# Patient Record
Sex: FEMALE | Race: BLACK OR AFRICAN AMERICAN
[De-identification: names, ages, dates, MRNs, and addresses within clinical notes are randomized per-mention and may not be internally consistent; named-entity substitution may affect disease eponyms.]

---

## 2018-02-18 NOTE — NUR
Patient discharged with v/s stable. Written and verbal after care instructions 
given and explained. 

Patient alert, oriented and verbalized understanding of instructions. 
Ambulatory with steady gait. All questions addressed prior to discharge. ID 
band removed. Patient advised to follow up with PMD. Rx of Augmentin given. 
Patient educated on indication of medication including possible reaction and 
side effects. Opportunity to ask questions provided and answered.

## 2018-02-18 NOTE — NUR
62F BIB SELF WITH C/O NASAL DRAINAGE X 2 DAYS, DENIES ANY FEVERS/CHILLS. NO 
RESP DISTRESS. RESP EVEN AND UNLABORED. PT IS AOX4 WITH STEADY GAIT. SKIN 
WARM/PINK/DRY. NAD. VSS. WILL CONTINUE TO MONITOR.

## 2019-04-25 ENCOUNTER — HOSPITAL ENCOUNTER (EMERGENCY)
Dept: HOSPITAL 26 - MED | Age: 64
Discharge: HOME | End: 2019-04-25
Payer: MEDICARE

## 2019-04-25 VITALS — SYSTOLIC BLOOD PRESSURE: 137 MMHG | DIASTOLIC BLOOD PRESSURE: 79 MMHG

## 2019-04-25 VITALS — HEIGHT: 71 IN | BODY MASS INDEX: 21 KG/M2 | WEIGHT: 150 LBS
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
## 2019-04-25 DIAGNOSIS — N39.0: ICD-10-CM

## 2019-04-25 DIAGNOSIS — S92.501A: Primary | ICD-10-CM

## 2019-04-25 DIAGNOSIS — Z85.038: ICD-10-CM

## 2019-04-25 DIAGNOSIS — I10: ICD-10-CM
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
## 2019-04-25 DIAGNOSIS — Z79.899: ICD-10-CM

## 2019-04-25 LAB
APPEARANCE UR: CLEAR
BILIRUB UR QL STRIP: (no result)
COLOR UR: YELLOW
GLUCOSE UR STRIP-MCNC: (no result) MG/DL
HGB UR QL STRIP: (no result)
LEUKOCYTE ESTERASE UR QL STRIP: NEGATIVE
NITRITE UR QL STRIP: NEGATIVE
PH UR STRIP: 6 [PH] (ref 5–9)

## 2019-04-25 PROCEDURE — 73660 X-RAY EXAM OF TOE(S): CPT

## 2019-04-25 PROCEDURE — 81003 URINALYSIS AUTO W/O SCOPE: CPT

## 2019-04-25 PROCEDURE — 96372 THER/PROPH/DIAG INJ SC/IM: CPT

## 2019-04-25 PROCEDURE — 99284 EMERGENCY DEPT VISIT MOD MDM: CPT

## 2019-04-25 NOTE — NUR
PT PRESENTED ER WITH C/O OF PAIN TO THE LEFT 2 TOES X 2 WEEKS. PT STATED SHE 
WAS PLAYING BASKETBALL WITH HER GRANDSON AND INJURED HER FOOT. PT HAS ROM AND 
IS ABLE TO WIGGLE TOES. NO BRUISING OR REDNESS TO SIGHT. PT IS A/O X 4. PAIN 
LEVEL IS 8/10 AT THIS TIME. ER MD MADE AWARE OF STATUS. SAFETY PROTOCOL 
IMPLEMENTED, BED RAILS UP X 1.

## 2019-04-25 NOTE — NUR
Patient discharged with v/s stable. Written and verbal after care instructions 
given and explained. 

Patient alert, oriented and verbalized understanding of instructions. 
Ambulatory with steady gait. All questions addressed prior to discharge. ID 
band removed. Patient advised to follow up with PMD. Rx of ZOFRAN, MOTRIN, AND 
BACTRIM WAS given. Patient educated on indication of medication including 
possible reaction and side effects. Opportunity to ask questions provided and 
answered.

## 2019-06-22 ENCOUNTER — HOSPITAL ENCOUNTER (EMERGENCY)
Dept: HOSPITAL 26 - MED | Age: 64
Discharge: HOME | End: 2019-06-22
Payer: MEDICARE

## 2019-06-22 VITALS — SYSTOLIC BLOOD PRESSURE: 120 MMHG | DIASTOLIC BLOOD PRESSURE: 91 MMHG

## 2019-06-22 VITALS — HEIGHT: 71 IN | WEIGHT: 134 LBS | BODY MASS INDEX: 18.76 KG/M2

## 2019-06-22 DIAGNOSIS — Z90.49: ICD-10-CM

## 2019-06-22 DIAGNOSIS — I10: ICD-10-CM

## 2019-06-22 DIAGNOSIS — Z98.890: ICD-10-CM

## 2019-06-22 DIAGNOSIS — R19.7: ICD-10-CM

## 2019-06-22 DIAGNOSIS — Z79.899: ICD-10-CM

## 2019-06-22 DIAGNOSIS — Z85.038: ICD-10-CM

## 2019-06-22 DIAGNOSIS — N39.0: Primary | ICD-10-CM

## 2019-06-22 PROCEDURE — 81025 URINE PREGNANCY TEST: CPT

## 2019-06-22 PROCEDURE — 81002 URINALYSIS NONAUTO W/O SCOPE: CPT

## 2019-06-22 PROCEDURE — 96372 THER/PROPH/DIAG INJ SC/IM: CPT

## 2019-06-22 PROCEDURE — 99283 EMERGENCY DEPT VISIT LOW MDM: CPT

## 2019-06-22 NOTE — NUR
BIB SELF. PT C/O  BURNING SENSATION DURING URINATION, CHILLS SINCE YESTERDAY. 
STATES HAVING PAIN 8/10 AT LOWER ABD, DENIES FEVER, NAUSEA, VOMITING.PT AAOX4. 
ABDOMEN SOFT AND NON-TENDER TO TOUCH.VS NORMAL AT THIS TIME. PROVIDED URINE CUP 
FOR SPECIMEN. ER MD TO SEE PT.

## 2019-06-22 NOTE — NUR
Patient discharged with v/s stable. Written and verbal after care instructions 
given and explained. 

Patient alert, oriented and verbalized understanding of instructions. 
Ambulatory with steady gait. All questions addressed prior to discharge. ID 
band removed. Patient advised to follow up with PMD. Rx of INDERAL, MOTRIN AND 
CIPRO given. Patient educated on indication of medication including possible 
reaction and side effects. Opportunity to ask questions provided and answered.

## 2019-07-10 ENCOUNTER — HOSPITAL ENCOUNTER (EMERGENCY)
Dept: HOSPITAL 26 - MED | Age: 64
Discharge: HOME | End: 2019-07-10
Payer: MEDICARE

## 2019-07-10 VITALS — DIASTOLIC BLOOD PRESSURE: 60 MMHG | SYSTOLIC BLOOD PRESSURE: 122 MMHG

## 2019-07-10 VITALS — HEIGHT: 71 IN | BODY MASS INDEX: 17.5 KG/M2 | WEIGHT: 125 LBS

## 2019-07-10 VITALS — DIASTOLIC BLOOD PRESSURE: 67 MMHG | SYSTOLIC BLOOD PRESSURE: 133 MMHG

## 2019-07-10 DIAGNOSIS — Z79.899: ICD-10-CM

## 2019-07-10 DIAGNOSIS — R27.0: Primary | ICD-10-CM

## 2019-07-10 DIAGNOSIS — Z85.038: ICD-10-CM

## 2019-07-10 DIAGNOSIS — I10: ICD-10-CM

## 2019-07-10 DIAGNOSIS — F16.10: ICD-10-CM

## 2019-07-10 DIAGNOSIS — F32.9: ICD-10-CM

## 2019-07-10 DIAGNOSIS — Z86.19: ICD-10-CM

## 2019-07-10 LAB
ALBUMIN FLD-MCNC: 3.5 G/DL (ref 3.4–5)
AMYLASE SERPL-CCNC: 147 U/L (ref 25–115)
ANION GAP SERPL CALCULATED.3IONS-SCNC: 11.2 MMOL/L (ref 8–16)
APPEARANCE UR: CLEAR
AST SERPL-CCNC: 53 U/L (ref 15–37)
BARBITURATES UR QL SCN: (no result) NG/ML
BASOPHILS # BLD AUTO: 0 K/UL (ref 0–0.22)
BASOPHILS NFR BLD AUTO: 0.4 % (ref 0–2)
BENZODIAZ UR QL SCN: (no result) NG/ML
BILIRUB SERPL-MCNC: 0.4 MG/DL (ref 0–1)
BILIRUB UR QL STRIP: NEGATIVE
BUN SERPL-MCNC: 21 MG/DL (ref 7–18)
BZE UR QL SCN: (no result) NG/ML
CANNABINOIDS UR QL SCN: (no result) NG/ML
CHLORIDE SERPL-SCNC: 103 MMOL/L (ref 98–107)
CO2 SERPL-SCNC: 29.8 MMOL/L (ref 21–32)
COLOR UR: YELLOW
CREAT SERPL-MCNC: 0.9 MG/DL (ref 0.6–1.3)
DEPRECATED D DIMER PPP-ACNC: 452 NG/ML (ref 0–400)
EOSINOPHIL # BLD AUTO: 0 K/UL (ref 0–0.4)
EOSINOPHIL NFR BLD AUTO: 0.9 % (ref 0–4)
ERYTHROCYTE [DISTWIDTH] IN BLOOD BY AUTOMATED COUNT: 14.6 % (ref 11.6–13.7)
GFR SERPL CREATININE-BSD FRML MDRD: 81 ML/MIN (ref 90–?)
GLUCOSE SERPL-MCNC: 107 MG/DL (ref 74–106)
GLUCOSE UR STRIP-MCNC: NEGATIVE MG/DL
HCT VFR BLD AUTO: 38.1 % (ref 36–48)
HGB BLD-MCNC: 13 G/DL (ref 12–16)
HGB UR QL STRIP: NEGATIVE
LEUKOCYTE ESTERASE UR QL STRIP: NEGATIVE
LIPASE SERPL-CCNC: 418 U/L (ref 73–393)
LYMPHOCYTES # BLD AUTO: 1.3 K/UL (ref 2.5–16.5)
LYMPHOCYTES NFR BLD AUTO: 34.9 % (ref 20.5–51.1)
MAGNESIUM SERPL-MCNC: 1.8 MG/DL (ref 1.8–2.4)
MCH RBC QN AUTO: 32 PG (ref 27–31)
MCHC RBC AUTO-ENTMCNC: 34 G/DL (ref 33–37)
MCV RBC AUTO: 94.6 FL (ref 80–94)
MONOCYTES # BLD AUTO: 0.4 K/UL (ref 0.8–1)
MONOCYTES NFR BLD AUTO: 11.2 % (ref 1.7–9.3)
NEUTROPHILS # BLD AUTO: 2 K/UL (ref 1.8–7.7)
NEUTROPHILS NFR BLD AUTO: 52.6 % (ref 42.2–75.2)
NITRITE UR QL STRIP: NEGATIVE
OPIATES UR QL SCN: (no result) NG/ML
PCP UR QL SCN: (no result) NG/ML
PH UR STRIP: 6.5 [PH] (ref 5–9)
PLATELET # BLD AUTO: 167 K/UL (ref 140–450)
POTASSIUM SERPL-SCNC: 4 MMOL/L (ref 3.5–5.1)
PROTHROMBIN TIME: 9.5 SECS (ref 10.8–13.4)
RBC # BLD AUTO: 4.03 MIL/UL (ref 4.2–5.4)
SODIUM SERPL-SCNC: 140 MMOL/L (ref 136–145)
URATE SERPL-MCNC: 3.5 MG/DL (ref 2.6–7.2)
WBC # BLD AUTO: 3.7 K/UL (ref 4.8–10.8)

## 2019-07-10 PROCEDURE — 83690 ASSAY OF LIPASE: CPT

## 2019-07-10 PROCEDURE — 83735 ASSAY OF MAGNESIUM: CPT

## 2019-07-10 PROCEDURE — 81003 URINALYSIS AUTO W/O SCOPE: CPT

## 2019-07-10 PROCEDURE — 70450 CT HEAD/BRAIN W/O DYE: CPT

## 2019-07-10 PROCEDURE — 96374 THER/PROPH/DIAG INJ IV PUSH: CPT

## 2019-07-10 PROCEDURE — 80173 ASSAY OF HALOPERIDOL: CPT

## 2019-07-10 PROCEDURE — 80053 COMPREHEN METABOLIC PANEL: CPT

## 2019-07-10 PROCEDURE — 86140 C-REACTIVE PROTEIN: CPT

## 2019-07-10 PROCEDURE — 82550 ASSAY OF CK (CPK): CPT

## 2019-07-10 PROCEDURE — 86886 COOMBS TEST INDIRECT TITER: CPT

## 2019-07-10 PROCEDURE — 85025 COMPLETE CBC W/AUTO DIFF WBC: CPT

## 2019-07-10 PROCEDURE — 81025 URINE PREGNANCY TEST: CPT

## 2019-07-10 PROCEDURE — 86901 BLOOD TYPING SEROLOGIC RH(D): CPT

## 2019-07-10 PROCEDURE — 84484 ASSAY OF TROPONIN QUANT: CPT

## 2019-07-10 PROCEDURE — 71045 X-RAY EXAM CHEST 1 VIEW: CPT

## 2019-07-10 PROCEDURE — 82150 ASSAY OF AMYLASE: CPT

## 2019-07-10 PROCEDURE — 85610 PROTHROMBIN TIME: CPT

## 2019-07-10 PROCEDURE — 80305 DRUG TEST PRSMV DIR OPT OBS: CPT

## 2019-07-10 PROCEDURE — 84550 ASSAY OF BLOOD/URIC ACID: CPT

## 2019-07-10 PROCEDURE — 86900 BLOOD TYPING SEROLOGIC ABO: CPT

## 2019-07-10 PROCEDURE — 93005 ELECTROCARDIOGRAM TRACING: CPT

## 2019-07-10 PROCEDURE — 85379 FIBRIN DEGRADATION QUANT: CPT

## 2019-07-10 PROCEDURE — 36415 COLL VENOUS BLD VENIPUNCTURE: CPT

## 2019-07-10 PROCEDURE — 99284 EMERGENCY DEPT VISIT MOD MDM: CPT

## 2019-07-10 NOTE — NUR
Patient discharged with v/s stable. Written and verbal after care instructions 
given and explained. 

Patient alert, oriented and verbalized understanding of instructions. 
Ambulatory with steady gait. All questions addressed prior to discharge. ID 
band removed. Patient advised to follow up with PMD. Rx of VISTARIL given. 
Patient educated on indication of medication including possible reaction and 
side effects. Opportunity to ask questions provided and answered.

## 2019-07-10 NOTE — NUR
PT BIBA C/O FALL, PT STATES SHE WAS GETTING OUT OF CAR AND FELL AND HIT BACK OF 
HEAD, PT DENIES LOC, PT STATES SHE FELL 2 DAYS AGO AND HAD STITCHES PLACED TO 
RT SIDE OF EYEBROW. PT AWAKE AND ALERT, PUPILS EQUAL AND REACTIVE, PT RECALLS 
ALL EVENTS THAT HAPPENED TODAY, AMBULATORY . DENIES N/V/D; SKIN IS 
PINK/WARM/DRY; AAOX4; PT DENIES ANY FEVER, CP, SOB, OR COUGH AT THIS TIME; 
PATIENT STATES BACK PAIN AND HEADACHE OF 7/10 AT THIS TIME; VSS; PATIENT 
POSITIONED FOR COMFORT; HOB ELEVATED; BEDRAILS UP X1; BED DOWN. ER MD MADE 
AWARE OF PT STATUS.

## 2019-07-25 ENCOUNTER — HOSPITAL ENCOUNTER (EMERGENCY)
Dept: HOSPITAL 26 - MED | Age: 64
Discharge: HOME | End: 2019-07-25
Payer: MEDICARE

## 2019-07-25 VITALS — DIASTOLIC BLOOD PRESSURE: 70 MMHG | SYSTOLIC BLOOD PRESSURE: 135 MMHG

## 2019-07-25 VITALS — WEIGHT: 130 LBS | BODY MASS INDEX: 18.2 KG/M2 | HEIGHT: 71 IN

## 2019-07-25 VITALS — SYSTOLIC BLOOD PRESSURE: 144 MMHG | DIASTOLIC BLOOD PRESSURE: 71 MMHG

## 2019-07-25 DIAGNOSIS — I10: ICD-10-CM

## 2019-07-25 DIAGNOSIS — R30.0: ICD-10-CM

## 2019-07-25 DIAGNOSIS — R05: ICD-10-CM

## 2019-07-25 DIAGNOSIS — F03.90: Primary | ICD-10-CM

## 2019-07-25 DIAGNOSIS — Z85.038: ICD-10-CM

## 2019-07-25 DIAGNOSIS — Z79.899: ICD-10-CM

## 2019-07-25 LAB
ALBUMIN FLD-MCNC: 3.1 G/DL (ref 3.4–5)
ANION GAP SERPL CALCULATED.3IONS-SCNC: 8.2 MMOL/L (ref 8–16)
APPEARANCE UR: CLEAR
AST SERPL-CCNC: 35 U/L (ref 15–37)
BASOPHILS # BLD AUTO: 0 K/UL (ref 0–0.22)
BASOPHILS NFR BLD AUTO: 0.2 % (ref 0–2)
BILIRUB SERPL-MCNC: 0.4 MG/DL (ref 0–1)
BILIRUB UR QL STRIP: NEGATIVE
BUN SERPL-MCNC: 10 MG/DL (ref 7–18)
CHLORIDE SERPL-SCNC: 108 MMOL/L (ref 98–107)
CO2 SERPL-SCNC: 30.9 MMOL/L (ref 21–32)
COLOR UR: YELLOW
CREAT SERPL-MCNC: 0.8 MG/DL (ref 0.6–1.3)
EOSINOPHIL # BLD AUTO: 0.1 K/UL (ref 0–0.4)
EOSINOPHIL NFR BLD AUTO: 2.5 % (ref 0–4)
ERYTHROCYTE [DISTWIDTH] IN BLOOD BY AUTOMATED COUNT: 14.1 % (ref 11.6–13.7)
GFR SERPL CREATININE-BSD FRML MDRD: 93 ML/MIN (ref 90–?)
GLUCOSE SERPL-MCNC: 91 MG/DL (ref 74–106)
GLUCOSE UR STRIP-MCNC: NEGATIVE MG/DL
HCT VFR BLD AUTO: 32.5 % (ref 36–48)
HGB BLD-MCNC: 11.3 G/DL (ref 12–16)
HGB UR QL STRIP: NEGATIVE
LEUKOCYTE ESTERASE UR QL STRIP: (no result)
LYMPHOCYTES # BLD AUTO: 1.1 K/UL (ref 2.5–16.5)
LYMPHOCYTES NFR BLD AUTO: 42.8 % (ref 20.5–51.1)
MCH RBC QN AUTO: 32 PG (ref 27–31)
MCHC RBC AUTO-ENTMCNC: 35 G/DL (ref 33–37)
MCV RBC AUTO: 93.5 FL (ref 80–94)
MONOCYTES # BLD AUTO: 0.4 K/UL (ref 0.8–1)
MONOCYTES NFR BLD AUTO: 15.8 % (ref 1.7–9.3)
NEUTROPHILS # BLD AUTO: 1 K/UL (ref 1.8–7.7)
NEUTROPHILS NFR BLD AUTO: 38.7 % (ref 42.2–75.2)
NITRITE UR QL STRIP: NEGATIVE
PH UR STRIP: 8.5 [PH] (ref 5–9)
PLATELET # BLD AUTO: 133 K/UL (ref 140–450)
POTASSIUM SERPL-SCNC: 4.1 MMOL/L (ref 3.5–5.1)
RBC # BLD AUTO: 3.48 MIL/UL (ref 4.2–5.4)
RBC #/AREA URNS HPF: (no result) /HPF (ref 0–5)
SODIUM SERPL-SCNC: 143 MMOL/L (ref 136–145)
WBC # BLD AUTO: 2.7 K/UL (ref 4.8–10.8)
WBC,URINE: (no result) /HPF (ref 0–5)

## 2019-07-25 PROCEDURE — 85025 COMPLETE CBC W/AUTO DIFF WBC: CPT

## 2019-07-25 PROCEDURE — 84484 ASSAY OF TROPONIN QUANT: CPT

## 2019-07-25 PROCEDURE — 93005 ELECTROCARDIOGRAM TRACING: CPT

## 2019-07-25 PROCEDURE — 99284 EMERGENCY DEPT VISIT MOD MDM: CPT

## 2019-07-25 PROCEDURE — 71045 X-RAY EXAM CHEST 1 VIEW: CPT

## 2019-07-25 PROCEDURE — 80053 COMPREHEN METABOLIC PANEL: CPT

## 2019-07-25 PROCEDURE — 36415 COLL VENOUS BLD VENIPUNCTURE: CPT

## 2019-07-25 PROCEDURE — 81001 URINALYSIS AUTO W/SCOPE: CPT

## 2019-07-25 NOTE — NUR
BIB ALS, C/O cough, chest pain, headache x 3 days . pt also c/o dysuria and 
urgency x 3 days. DENIES N/V/D; SKIN IS PINK/WARM/DRY;awake,alert. LUNGS CLEAR 
BL; HR EVEN AND REGULAR; PT DENIES ANY FEVER, SOB, AT THIS TIME; PATIENT STATES 
PAIN OF 7/10 AT THIS TIME; VSS; PATIENT POSITIONED FOR COMFORT; HOB ELEVATED; 
BEDRAILS UP X2; BED DOWN. ER MD MADE AWARE OF PT STATUS.

## 2019-07-25 NOTE — NUR
PT STS " I DO NOT WANT TO GO BACK TO MY DAUGHTER HOUSE BECAUSE 

ALL SHE WANTS ME TO DO IS WATCH HER KIDS. I WANT TO GO BACK TO THE NURSING 
FACILITY I WAS AT."

## 2019-07-25 NOTE — NUR
Patient discharged with v/s stable. Written and verbal after care instructions 
given and explained. 

Patient alert, oriented and verbalized understanding of instructions. 
Ambulatory with steady gait. All questions addressed prior to discharge. ID 
band removed. Patient advised to follow up with PMD. Rx of PROMETHAZINE NAD 
PYRIDIUM given. Patient educated on indication of medication including possible 
reaction and side effects. Opportunity to ask questions provided and answered.

## 2019-07-25 NOTE — NUR
-------------------------------------------------------------------------------

            *** Note undone in EDM - 07/25/19 at 1234 by ERICA ***            

-------------------------------------------------------------------------------

Patient discharged with v/s stable. Written and verbal after care instructions 
given and explained. 

Patient alert, oriented and verbalized understanding of instructions. 
Ambulatory with steady gait. All questions addressed prior to discharge. ID 
band removed. Patient advised to follow up with PMD. Rx of PROMETHAZINE NAD 
PYRIDIUM given. Patient educated on indication of medication including possible 
reaction and side effects. Opportunity to ask questions provided and answered. 
PT STATED SHE WANTS TO WAIT FOR SOCIAL WORK IN [employer]. LUNCH PACKAGE OFFERED.

## 2019-08-24 ENCOUNTER — HOSPITAL ENCOUNTER (INPATIENT)
Dept: HOSPITAL 26 - MED | Age: 64
LOS: 2 days | Discharge: HOME | DRG: 812 | End: 2019-08-26
Attending: GENERAL PRACTICE | Admitting: GENERAL PRACTICE
Payer: MEDICAID

## 2019-08-24 VITALS — DIASTOLIC BLOOD PRESSURE: 75 MMHG | SYSTOLIC BLOOD PRESSURE: 129 MMHG

## 2019-08-24 VITALS — WEIGHT: 122 LBS | HEIGHT: 71 IN | BODY MASS INDEX: 17.08 KG/M2

## 2019-08-24 VITALS — SYSTOLIC BLOOD PRESSURE: 136 MMHG | DIASTOLIC BLOOD PRESSURE: 78 MMHG

## 2019-08-24 DIAGNOSIS — Z72.89: ICD-10-CM

## 2019-08-24 DIAGNOSIS — E83.42: ICD-10-CM

## 2019-08-24 DIAGNOSIS — E86.0: ICD-10-CM

## 2019-08-24 DIAGNOSIS — Z85.038: ICD-10-CM

## 2019-08-24 DIAGNOSIS — Z83.3: ICD-10-CM

## 2019-08-24 DIAGNOSIS — Z79.899: ICD-10-CM

## 2019-08-24 DIAGNOSIS — G89.29: ICD-10-CM

## 2019-08-24 DIAGNOSIS — G92: ICD-10-CM

## 2019-08-24 DIAGNOSIS — E78.1: ICD-10-CM

## 2019-08-24 DIAGNOSIS — N20.0: ICD-10-CM

## 2019-08-24 DIAGNOSIS — G43.909: ICD-10-CM

## 2019-08-24 DIAGNOSIS — T40.7X1A: Primary | ICD-10-CM
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
## 2019-08-24 DIAGNOSIS — I10: ICD-10-CM

## 2019-08-24 DIAGNOSIS — F12.90: ICD-10-CM

## 2019-08-24 DIAGNOSIS — Z90.49: ICD-10-CM

## 2019-08-24 DIAGNOSIS — K85.90: ICD-10-CM

## 2019-08-24 DIAGNOSIS — Z82.49: ICD-10-CM

## 2019-08-24 DIAGNOSIS — N39.0: ICD-10-CM

## 2019-08-24 DIAGNOSIS — K21.9: ICD-10-CM

## 2019-08-24 DIAGNOSIS — Z91.19: ICD-10-CM

## 2019-08-24 DIAGNOSIS — M54.9: ICD-10-CM

## 2019-08-24 DIAGNOSIS — N94.89: ICD-10-CM

## 2019-08-24 LAB
ALBUMIN FLD-MCNC: 3.4 G/DL (ref 3.4–5)
AMYLASE SERPL-CCNC: 221 U/L (ref 25–115)
ANION GAP SERPL CALCULATED.3IONS-SCNC: 11.5 MMOL/L (ref 8–16)
APPEARANCE UR: CLEAR
AST SERPL-CCNC: 31 U/L (ref 15–37)
BASOPHILS # BLD AUTO: 0 K/UL (ref 0–0.22)
BASOPHILS NFR BLD AUTO: 0.6 % (ref 0–2)
BILIRUB SERPL-MCNC: 0.3 MG/DL (ref 0–1)
BILIRUB UR QL STRIP: NEGATIVE
BUN SERPL-MCNC: 15 MG/DL (ref 7–18)
CAOX CRY URNS QL MICRO: (no result) /HPF
CHLORIDE SERPL-SCNC: 103 MMOL/L (ref 98–107)
CHOLEST/HDLC SERPL: 2.1 {RATIO} (ref 1–4.5)
CO2 SERPL-SCNC: 29.2 MMOL/L (ref 21–32)
COLOR UR: YELLOW
CREAT SERPL-MCNC: 0.8 MG/DL (ref 0.6–1.3)
EOSINOPHIL # BLD AUTO: 0 K/UL (ref 0–0.4)
EOSINOPHIL NFR BLD AUTO: 1 % (ref 0–4)
ERYTHROCYTE [DISTWIDTH] IN BLOOD BY AUTOMATED COUNT: 13.1 % (ref 11.6–13.7)
GFR SERPL CREATININE-BSD FRML MDRD: 93 ML/MIN (ref 90–?)
GLUCOSE SERPL-MCNC: 107 MG/DL (ref 74–106)
GLUCOSE UR STRIP-MCNC: NEGATIVE MG/DL
HCT VFR BLD AUTO: 34.4 % (ref 36–48)
HDLC SERPL-MCNC: 70 MG/DL (ref 40–60)
HGB BLD-MCNC: 12 G/DL (ref 12–16)
HGB UR QL STRIP: NEGATIVE
LDLC SERPL CALC-MCNC: 40 MG/DL (ref 60–100)
LEUKOCYTE ESTERASE UR QL STRIP: (no result)
LIPASE SERPL-CCNC: 1118 U/L (ref 73–393)
LYMPHOCYTES # BLD AUTO: 1.5 K/UL (ref 2.5–16.5)
LYMPHOCYTES NFR BLD AUTO: 43.7 % (ref 20.5–51.1)
MAGNESIUM SERPL-MCNC: 1.7 MG/DL (ref 1.8–2.4)
MCH RBC QN AUTO: 33 PG (ref 27–31)
MCHC RBC AUTO-ENTMCNC: 35 G/DL (ref 33–37)
MCV RBC AUTO: 93.2 FL (ref 80–94)
MONOCYTES # BLD AUTO: 0.4 K/UL (ref 0.8–1)
MONOCYTES NFR BLD AUTO: 11.1 % (ref 1.7–9.3)
NEUTROPHILS # BLD AUTO: 1.5 K/UL (ref 1.8–7.7)
NEUTROPHILS NFR BLD AUTO: 43.6 % (ref 42.2–75.2)
NITRITE UR QL STRIP: NEGATIVE
PH UR STRIP: 5.5 [PH] (ref 5–9)
PHOSPHATE SERPL-MCNC: 3.6 MG/DL (ref 2.5–4.9)
PLATELET # BLD AUTO: 119 K/UL (ref 140–450)
POTASSIUM SERPL-SCNC: 3.7 MMOL/L (ref 3.5–5.1)
PROTHROMBIN TIME: 10.4 SECS (ref 10.8–13.4)
RBC # BLD AUTO: 3.69 MIL/UL (ref 4.2–5.4)
RBC #/AREA URNS HPF: (no result) /HPF (ref 0–5)
SODIUM SERPL-SCNC: 140 MMOL/L (ref 136–145)
TRIGL SERPL-MCNC: 177 MG/DL (ref 30–150)
TSH SERPL DL<=0.05 MIU/L-ACNC: 0.48 UIU/ML (ref 0.34–3.74)
WBC # BLD AUTO: 3.5 K/UL (ref 4.8–10.8)
WBC,URINE: (no result) /HPF (ref 0–5)

## 2019-08-24 PROCEDURE — C9113 INJ PANTOPRAZOLE SODIUM, VIA: HCPCS

## 2019-08-24 RX ADMIN — TAMSULOSIN HYDROCHLORIDE SCH MG: 0.4 CAPSULE ORAL at 22:03

## 2019-08-24 RX ADMIN — Medication SCH ML: at 21:00

## 2019-08-24 RX ADMIN — PANTOPRAZOLE SODIUM SCH MG: 40 INJECTION, POWDER, FOR SOLUTION INTRAVENOUS at 21:14

## 2019-08-24 RX ADMIN — SODIUM CHLORIDE SCH MLS/HR: 9 INJECTION, SOLUTION INTRAVENOUS at 21:14

## 2019-08-24 NOTE — NUR
C/O PERIUMBILICAL PAIN X 4 DAYS THAT IS ACHING AND COMES AND GOES, PAINFUL 
URINATION X 3 DAYS, CONSTANT MIGRAINE X 3 DAYS. STATES MIGRAINE GALEANO IS CAUSING 
THE MOST PAIN RIGHT NOW 10/10. STATES NAUSEA. DENIES VOMITING. DENIES DIARRHEA. 
LBM TODAY. FELT WARM LAST NIGHT BUT DID NOT CHECK TEMP. TRIED PUTTING ICE PACK 
TO HEAD FOR HA BUT NO RELIEF. 



MED HX: HTN

-------------------------------------------------------------------------------

Addendum: 08/24/19 at 1617 by CARIE

-------------------------------------------------------------------------------

NOTE BY RAJ MCKAY RN

## 2019-08-24 NOTE — NUR
ADMITTED THIS 63 YEAR OLD FEMALE FORM ER PER WHEELCHAIR WITH CC OF ABDOMINAL PAIN AND BACK 
PAIN, AMBULATORY TO BED WITH STEADY GAIT, ASSESSMENT DONE, VITAL SIGNS STABLE, DENIES PAIN 
AT THIS TIME, ORIENTED TO ROOM AND CALL LIGHT, INSTRUCTED NPO EXCEPT MEDS AS ORDERED, 
VERBALIZED UNDERSTANDING, IVF INFUSING WELL, PLAN OF CARE DISCUSSED, CALL LIGHT WITHIN 
REACH.

## 2019-08-24 NOTE — NUR
PT COMPLAINING OF PAIN, VITAL SIGNS STABLE, MEDICATED WITH MORPHINE IVP, CONTINUE TO MONITOR 
CLOSELY.

## 2019-08-24 NOTE — NUR
Patient will be admitted to care of DR. GONSALVES. Admited to MED SURG.  Will go to 
[id number]. Belongings list completed.  Report to ALLY PINZON.

## 2019-08-25 VITALS — SYSTOLIC BLOOD PRESSURE: 121 MMHG | DIASTOLIC BLOOD PRESSURE: 61 MMHG

## 2019-08-25 VITALS — SYSTOLIC BLOOD PRESSURE: 123 MMHG | DIASTOLIC BLOOD PRESSURE: 70 MMHG

## 2019-08-25 VITALS — SYSTOLIC BLOOD PRESSURE: 110 MMHG | DIASTOLIC BLOOD PRESSURE: 64 MMHG

## 2019-08-25 LAB
ANION GAP SERPL CALCULATED.3IONS-SCNC: 8.1 MMOL/L (ref 8–16)
BARBITURATES UR QL SCN: (no result) NG/ML
BASOPHILS # BLD AUTO: 0 K/UL (ref 0–0.22)
BASOPHILS NFR BLD AUTO: 0.4 % (ref 0–2)
BENZODIAZ UR QL SCN: (no result) NG/ML
BUN SERPL-MCNC: 8 MG/DL (ref 7–18)
BZE UR QL SCN: (no result) NG/ML
CANNABINOIDS UR QL SCN: (no result) NG/ML
CHLORIDE SERPL-SCNC: 108 MMOL/L (ref 98–107)
CO2 SERPL-SCNC: 27.5 MMOL/L (ref 21–32)
CREAT SERPL-MCNC: 0.6 MG/DL (ref 0.6–1.3)
EOSINOPHIL # BLD AUTO: 0 K/UL (ref 0–0.4)
EOSINOPHIL NFR BLD AUTO: 1.8 % (ref 0–4)
ERYTHROCYTE [DISTWIDTH] IN BLOOD BY AUTOMATED COUNT: 13.3 % (ref 11.6–13.7)
GFR SERPL CREATININE-BSD FRML MDRD: 129 ML/MIN (ref 90–?)
GLUCOSE SERPL-MCNC: 87 MG/DL (ref 74–106)
HCT VFR BLD AUTO: 29 % (ref 36–48)
HGB BLD-MCNC: 10 G/DL (ref 12–16)
LIPASE SERPL-CCNC: 358 U/L (ref 73–393)
LYMPHOCYTES # BLD AUTO: 1.2 K/UL (ref 2.5–16.5)
LYMPHOCYTES NFR BLD AUTO: 45.3 % (ref 20.5–51.1)
MAGNESIUM SERPL-MCNC: 1.6 MG/DL (ref 1.8–2.4)
MCH RBC QN AUTO: 32 PG (ref 27–31)
MCHC RBC AUTO-ENTMCNC: 35 G/DL (ref 33–37)
MCV RBC AUTO: 93.1 FL (ref 80–94)
MONOCYTES # BLD AUTO: 0.3 K/UL (ref 0.8–1)
MONOCYTES NFR BLD AUTO: 11.8 % (ref 1.7–9.3)
NEUTROPHILS # BLD AUTO: 1 K/UL (ref 1.8–7.7)
NEUTROPHILS NFR BLD AUTO: 40.7 % (ref 42.2–75.2)
OPIATES UR QL SCN: (no result) NG/ML
PCP UR QL SCN: (no result) NG/ML
PHOSPHATE SERPL-MCNC: 3.3 MG/DL (ref 2.5–4.9)
PLATELET # BLD AUTO: 92 K/UL (ref 140–450)
POTASSIUM SERPL-SCNC: 3.6 MMOL/L (ref 3.5–5.1)
RBC # BLD AUTO: 3.11 MIL/UL (ref 4.2–5.4)
SODIUM SERPL-SCNC: 140 MMOL/L (ref 136–145)
WBC # BLD AUTO: 2.6 K/UL (ref 4.8–10.8)

## 2019-08-25 RX ADMIN — TAMSULOSIN HYDROCHLORIDE SCH MG: 0.4 CAPSULE ORAL at 10:02

## 2019-08-25 RX ADMIN — SODIUM CHLORIDE SCH MLS/HR: 9 INJECTION, SOLUTION INTRAVENOUS at 07:20

## 2019-08-25 RX ADMIN — SODIUM CHLORIDE SCH MLS/HR: 9 INJECTION, SOLUTION INTRAVENOUS at 16:15

## 2019-08-25 RX ADMIN — SODIUM CHLORIDE SCH MLS/HR: 9 INJECTION, SOLUTION INTRAVENOUS at 05:37

## 2019-08-25 RX ADMIN — SIMETHICONE CHEW TAB 80 MG PRN MG: 80 TABLET ORAL at 20:04

## 2019-08-25 RX ADMIN — PANTOPRAZOLE SODIUM SCH MG: 40 INJECTION, POWDER, FOR SOLUTION INTRAVENOUS at 10:02

## 2019-08-25 RX ADMIN — Medication PRN DROP: at 20:04

## 2019-08-25 RX ADMIN — SODIUM CHLORIDE SCH MLS/HR: 9 INJECTION, SOLUTION INTRAVENOUS at 01:11

## 2019-08-25 RX ADMIN — Medication SCH EACH: at 10:02

## 2019-08-25 RX ADMIN — Medication SCH DROP: at 20:16

## 2019-08-25 NOTE — NUR
PT SLEEPING, AROUSABLE TO NAME, VITAL SIGNS STABLE, DENIES ANY PAIN, IVF INFUSING WELL, 
CONTINUE TO MONITOR CLOSELY.

## 2019-08-25 NOTE — NUR
RECEIVED HAND OFF REPORT FROM PM RN PT AWAKE IN BED PT APPEARS STABLE AND IN NO APPARENT 
DISTRESS. ALL SAFETY MEASURES ARE IN PLACE WILL CONTINUE TO MONITOR.

## 2019-08-25 NOTE — NUR
PT MOVED TO 127A PT STABLE AND IN NO APPARENT DISTRESS. ALL SAFETY MEASURES ARE IN PLACE 
WILL CONTINUE TO MONITOR. PT HAS ALL PERSONAL BELONGINGS

## 2019-08-25 NOTE — NUR
FREQUENT ROUNDING ON PT PT APPEARS STABLE AND IN NO APPARENT DISTRESS. ALL SAFETY MEASURES 
ARE IN PLACE WILL CONTINUE TO MONITOR.

## 2019-08-25 NOTE — NUR
FREQUENT ROUNDING ON PT PT APPEARS STABLE AND IN NO APPARENT DISTRESS. ALL SAFETY MEASURES 
ARE IN PLACE WILL CONTINUE TO MONITOR

## 2019-08-25 NOTE — NUR
RECEIVED PT ON BED, AAOX4, DENIES ANY PAIN, IVF INFUSING WELL, STATED TOLERATING REGULAR 
DIET, PLAN OF CARE DISCUSSED, SAFETY MEASURES IN PLACE, CALL LIGHT WITHIN REACH.

## 2019-08-25 NOTE — NUR
PT COMPLAINING OF GAS, DR CASTELLON WITH NEW ORDER, MYLICON PO GIVEN, DUE ROCEPHIN IVPB 
ADMINISTERED, ALL NEEDS ATTENDED.

## 2019-08-26 VITALS — DIASTOLIC BLOOD PRESSURE: 65 MMHG | SYSTOLIC BLOOD PRESSURE: 127 MMHG

## 2019-08-26 VITALS — DIASTOLIC BLOOD PRESSURE: 68 MMHG | SYSTOLIC BLOOD PRESSURE: 130 MMHG

## 2019-08-26 LAB
ANION GAP SERPL CALCULATED.3IONS-SCNC: 8.8 MMOL/L (ref 8–16)
BASOPHILS # BLD AUTO: 0 K/UL (ref 0–0.22)
BASOPHILS NFR BLD AUTO: 0.4 % (ref 0–2)
BUN SERPL-MCNC: 6 MG/DL (ref 7–18)
CHLORIDE SERPL-SCNC: 109 MMOL/L (ref 98–107)
CO2 SERPL-SCNC: 27.1 MMOL/L (ref 21–32)
CREAT SERPL-MCNC: 0.7 MG/DL (ref 0.6–1.3)
EOSINOPHIL # BLD AUTO: 0.1 K/UL (ref 0–0.4)
EOSINOPHIL NFR BLD AUTO: 2.5 % (ref 0–4)
ERYTHROCYTE [DISTWIDTH] IN BLOOD BY AUTOMATED COUNT: 13.1 % (ref 11.6–13.7)
GFR SERPL CREATININE-BSD FRML MDRD: 108 ML/MIN (ref 90–?)
GLUCOSE SERPL-MCNC: 87 MG/DL (ref 74–106)
HCT VFR BLD AUTO: 29.9 % (ref 36–48)
HGB BLD-MCNC: 10.4 G/DL (ref 12–16)
LYMPHOCYTES # BLD AUTO: 1.1 K/UL (ref 2.5–16.5)
LYMPHOCYTES NFR BLD AUTO: 42.2 % (ref 20.5–51.1)
MAGNESIUM SERPL-MCNC: 1.5 MG/DL (ref 1.8–2.4)
MCH RBC QN AUTO: 32 PG (ref 27–31)
MCHC RBC AUTO-ENTMCNC: 35 G/DL (ref 33–37)
MCV RBC AUTO: 93.2 FL (ref 80–94)
MONOCYTES # BLD AUTO: 0.4 K/UL (ref 0.8–1)
MONOCYTES NFR BLD AUTO: 13.8 % (ref 1.7–9.3)
NEUTROPHILS # BLD AUTO: 1.1 K/UL (ref 1.8–7.7)
NEUTROPHILS NFR BLD AUTO: 41.1 % (ref 42.2–75.2)
PHOSPHATE SERPL-MCNC: 3.5 MG/DL (ref 2.5–4.9)
PLATELET # BLD AUTO: 93 K/UL (ref 140–450)
POTASSIUM SERPL-SCNC: 3.9 MMOL/L (ref 3.5–5.1)
RBC # BLD AUTO: 3.2 MIL/UL (ref 4.2–5.4)
SODIUM SERPL-SCNC: 141 MMOL/L (ref 136–145)
WBC # BLD AUTO: 2.7 K/UL (ref 4.8–10.8)

## 2019-08-26 RX ADMIN — FLUTICASONE PROPIONATE SCH GM: 50 SPRAY, METERED NASAL at 11:50

## 2019-08-26 RX ADMIN — SODIUM CHLORIDE SCH MLS/HR: 9 INJECTION, SOLUTION INTRAVENOUS at 03:22

## 2019-08-26 RX ADMIN — SODIUM CHLORIDE SCH MLS/HR: 9 INJECTION, SOLUTION INTRAVENOUS at 06:08

## 2019-08-26 RX ADMIN — SIMETHICONE CHEW TAB 80 MG PRN MG: 80 TABLET ORAL at 04:16

## 2019-08-26 RX ADMIN — SIMETHICONE CHEW TAB 80 MG PRN MG: 80 TABLET ORAL at 08:29

## 2019-08-26 RX ADMIN — PANTOPRAZOLE SODIUM SCH MG: 40 INJECTION, POWDER, FOR SOLUTION INTRAVENOUS at 08:21

## 2019-08-26 RX ADMIN — Medication PRN DROP: at 12:25

## 2019-08-26 RX ADMIN — TAMSULOSIN HYDROCHLORIDE SCH MG: 0.4 CAPSULE ORAL at 08:21

## 2019-08-26 RX ADMIN — Medication SCH EACH: at 08:21

## 2019-08-26 RX ADMIN — FLUTICASONE PROPIONATE SCH GM: 50 SPRAY, METERED NASAL at 12:24

## 2019-08-26 NOTE — NUR
PT DISCHARGED HOME FOR SELF CARE. PT GIVEN BUS VOUCHER TO GET HOME TO HER [facility]MAN[facility] 
RESIDENCE. PT SIGNED ALL DISCHARGE PAPERWORK. PT VERBALIZED UNDERSTANDING OF TEACHING. ALL 
MEDS SENT TO PT PHARMACY. ALL PERSONAL BELONGINGS TAKEN WITH PT. PT LEFT IN STABLE CONDITION 
ON BUS TO HOME.

## 2019-08-26 NOTE — NUR
RECEIVED BEDSIDE REPORT FROM NIGHT SHIFT RN. PT SLEEPING IN BED. ALL NEEDS CURRENTLY MET. NO 
DISTRESS NOTED. PT IS AAOX4. ON RA. PT AMBULATORY PER NIGHT SHIFT RN. SKIN IS INTACT. WILL 
EXPLAIN POC TO PT. BED IN LOW POSITION, CALL LIGHT WITHIN REACH. WILL ROUND FREQUENTLY ON 
PT.

## 2019-08-26 NOTE — NUR
PATIENT HAS BEEN SCREENED AND CATEGORIZED AS HIGH NUTRITION RISK. PATIENT WILL BE SEEN 
WITHIN 1-2 DAYS OF ADMISSION.



08/26/19



LAILA BE[address]LEY RD

## 2019-09-20 ENCOUNTER — HOSPITAL ENCOUNTER (EMERGENCY)
Dept: HOSPITAL 26 - MED | Age: 64
Discharge: HOME | End: 2019-09-20
Payer: MEDICARE

## 2019-09-20 VITALS — WEIGHT: 130 LBS | BODY MASS INDEX: 18.2 KG/M2 | HEIGHT: 71 IN

## 2019-09-20 VITALS — SYSTOLIC BLOOD PRESSURE: 135 MMHG | DIASTOLIC BLOOD PRESSURE: 79 MMHG

## 2019-09-20 VITALS — DIASTOLIC BLOOD PRESSURE: 98 MMHG | SYSTOLIC BLOOD PRESSURE: 155 MMHG

## 2019-09-20 DIAGNOSIS — N72: ICD-10-CM

## 2019-09-20 DIAGNOSIS — Z85.038: ICD-10-CM

## 2019-09-20 DIAGNOSIS — R51: Primary | ICD-10-CM

## 2019-09-20 DIAGNOSIS — Z79.899: ICD-10-CM

## 2019-09-20 DIAGNOSIS — I10: ICD-10-CM

## 2019-09-20 LAB
APPEARANCE UR: CLEAR
BILIRUB UR QL STRIP: NEGATIVE
COLOR UR: YELLOW
GLUCOSE UR STRIP-MCNC: NEGATIVE MG/DL
HGB UR QL STRIP: NEGATIVE
LEUKOCYTE ESTERASE UR QL STRIP: (no result)
NITRITE UR QL STRIP: NEGATIVE
PH UR STRIP: 6 [PH] (ref 5–9)
RBC #/AREA URNS HPF: (no result) /HPF (ref 0–5)
WBC,URINE: (no result) /HPF (ref 0–5)

## 2019-09-20 PROCEDURE — 36415 COLL VENOUS BLD VENIPUNCTURE: CPT

## 2019-09-20 PROCEDURE — 96372 THER/PROPH/DIAG INJ SC/IM: CPT

## 2019-09-20 PROCEDURE — 87491 CHLMYD TRACH DNA AMP PROBE: CPT

## 2019-09-20 PROCEDURE — 87070 CULTURE OTHR SPECIMN AEROBIC: CPT

## 2019-09-20 PROCEDURE — 87086 URINE CULTURE/COLONY COUNT: CPT

## 2019-09-20 PROCEDURE — 81001 URINALYSIS AUTO W/SCOPE: CPT

## 2019-09-20 PROCEDURE — 87210 SMEAR WET MOUNT SALINE/INK: CPT

## 2019-09-20 PROCEDURE — 99284 EMERGENCY DEPT VISIT MOD MDM: CPT

## 2020-12-02 NOTE — NUR
-------------------------------------------------------------------------------

            *** Note lauraone in EDM - 06/22/19 at 1057 by MEDBSS ***            

-------------------------------------------------------------------------------

BIB SELF. PT C/O  BURNING SENSATION DURING URINATION, CHILLS SINCE YESTERDAY. 
STATES HAVING PAIN 8/10 AT LOWER ABD, DENIES FEVER, NAUSEA, VOMITING. PT AAOX4. 
VS NORMAL AT THIS TIME. PROVIDED URINE CUP FOR SPECIMEN. BRIE EDWARD TO SEE PT. [As Noted in HPI] : as noted in HPI [Negative] : Heme/Lymph